# Patient Record
Sex: FEMALE | Race: BLACK OR AFRICAN AMERICAN | Employment: FULL TIME | URBAN - METROPOLITAN AREA
[De-identification: names, ages, dates, MRNs, and addresses within clinical notes are randomized per-mention and may not be internally consistent; named-entity substitution may affect disease eponyms.]

---

## 2017-12-03 ENCOUNTER — HOSPITAL ENCOUNTER (EMERGENCY)
Age: 37
Discharge: HOME | End: 2017-12-03
Attending: EMERGENCY MEDICINE
Payer: COMMERCIAL

## 2017-12-03 VITALS
WEIGHT: 270 LBS | OXYGEN SATURATION: 98 % | HEIGHT: 64 IN | TEMPERATURE: 97.8 F | RESPIRATION RATE: 18 BRPM | HEART RATE: 73 BPM | DIASTOLIC BLOOD PRESSURE: 92 MMHG | BODY MASS INDEX: 46.1 KG/M2 | SYSTOLIC BLOOD PRESSURE: 161 MMHG

## 2017-12-03 DIAGNOSIS — H61.21 IMPACTED CERUMEN OF RIGHT EAR: Primary | ICD-10-CM

## 2017-12-03 PROCEDURE — 74011250637 HC RX REV CODE- 250/637: Performed by: EMERGENCY MEDICINE

## 2017-12-03 PROCEDURE — 76010010392 HC REMOVAL IMPACTED WAX IRRIGATION/LVG UNI: Performed by: EMERGENCY MEDICINE

## 2017-12-03 PROCEDURE — 99284 EMERGENCY DEPT VISIT MOD MDM: CPT | Performed by: EMERGENCY MEDICINE

## 2017-12-03 RX ORDER — LIDOCAINE HYDROCHLORIDE 20 MG/ML
5 SOLUTION OROPHARYNGEAL
Status: DISCONTINUED | OUTPATIENT
Start: 2017-12-03 | End: 2017-12-03 | Stop reason: HOSPADM

## 2017-12-03 RX ORDER — ACETIC ACID 20.65 MG/ML
4 SOLUTION AURICULAR (OTIC) 4 TIMES DAILY
Qty: 15 ML | Refills: 0 | Status: SHIPPED | OUTPATIENT
Start: 2017-12-03 | End: 2017-12-10

## 2017-12-03 RX ADMIN — CARBAMIDE PEROXIDE 6.5% 5 DROP: 6.5 LIQUID AURICULAR (OTIC) at 12:44

## 2017-12-03 NOTE — ED NOTES
I have reviewed discharge instructions with the patient. The patient verbalized understanding. Patient left ED via Discharge Method: ambulatory to Home with family. Opportunity for questions and clarification provided. Patient given 1 scripts. To continue your aftercare when you leave the hospital, you may receive an automated call from our care team to check in on how you are doing. This is a free service and part of our promise to provide the best care and service to meet your aftercare needs.  If you have questions, or wish to unsubscribe from this service please call [phone]. Thank you for Choosing our [facility] Emergency Department.

## 2017-12-03 NOTE — DISCHARGE INSTRUCTIONS
Earwax Blockage: Care Instructions  Your Care Instructions    Earwax is a natural substance that protects the ear canal. Normally, earwax drains from the ears and does not cause problems. Sometimes earwax builds up and hardens. Earwax blockage (also called cerumen impaction) can cause some loss of hearing and pain. When wax is tightly packed, you will need to have your doctor remove it. Follow-up care is a key part of your treatment and safety. Be sure to make and go to all appointments, and call your doctor if you are having problems. It's also a good idea to know your test results and keep a list of the medicines you take. How can you care for yourself at home? · Do not try to remove earwax with cotton swabs, fingers, or other objects. This can make the blockage worse and damage the eardrum. · If your doctor recommends that you try to remove earwax at home:  ¨ Soften and loosen the earwax with warm mineral oil. You also can try hydrogen peroxide mixed with an equal amount of room temperature water. Place 2 drops of the fluid, warmed to body temperature, in the ear two times a day for up to 5 days. ¨ Once the wax is loose and soft, all that is usually needed to remove it from the ear canal is a gentle, warm shower. Direct the water into the ear, then tip your head to let the earwax drain out. Dry your ear thoroughly with a hair dryer set on low. Hold the dryer several inches from your ear. ¨ If the warm mineral oil and shower do not work, use an over-the-counter wax softener followed by gentle flushing with an ear syringe each night for a week or two. Make sure the flushing solution is body temperature. Cool or hot fluids in the ear can cause dizziness. When should you call for help? Call your doctor now or seek immediate medical care if:  ? · Pus or blood drains from your ear. ? · Your ears are ringing or feel full. ? · You have a loss of hearing. ? Watch closely for changes in your health, and be sure to contact your doctor if:  ? · You have pain or reduced hearing after 1 week of home treatment. ? · You have any new symptoms, such as nausea or balance problems. Where can you learn more? Go to http://edgardo-ethan.info/. Enter Y732 in the search box to learn more about \"Earwax Blockage: Care Instructions. \"  Current as of: March 20, 2017  Content Version: 11.4  © 8532-3229 [employer]. Care instructions adapted under license by [employer] (which disclaims liability or warranty for this information). If you have questions about a medical condition or this instruction, always ask your healthcare professional. [place] [address] any warranty or liability for your use of this information.

## 2017-12-03 NOTE — ED PROVIDER NOTES
HPI Comments: 58-year-old female presents with pain and decreased hearing from her right ear. She was seen for well DM check by internal medicine clinic 11/29. During that visit she reported decreased hearing from her right ear. She had right cerumen disimpaction and irrigation at that visit. She has had increasing discomfort since that time. She denies fever or drainage. Patient is a 42 y.o. female presenting with ear pain. The history is provided by the patient. Ear Pain    Associated symptoms include hearing loss. Pertinent negatives include no ear discharge, no headaches, no vomiting and no rash. Past Medical History:   Diagnosis Date    Diabetes ([address]     Hypertension        No past surgical history on file. No family history on file. Social History     Social History    Marital status: SINGLE     Spouse name: N/A    Number of children: N/A    Years of education: N/A     Occupational History    Not on file. Social History Main Topics    Smoking status: Never Smoker    Smokeless tobacco: Not on file    Alcohol use Yes      Comment: occasional    Drug use: No    Sexual activity: No     Other Topics Concern    Not on file     Social History Narrative         ALLERGIES: Coconut and Pineapple    Review of Systems   Constitutional: Negative for fever. HENT: Positive for ear pain and hearing loss. Negative for ear discharge and facial swelling. Eyes: Negative for visual disturbance. Gastrointestinal: Negative for vomiting. Skin: Negative for rash. Neurological: Negative for headaches. Vitals:    12/03/17 1139   BP: 149/76   Pulse: 99   Resp: 16   Temp: 97.6 °F (36.4 °C)   SpO2: 98%   Weight: 122.5 kg (270 lb)   Height: 5' 4\" (1.626 m)            Physical Exam   Constitutional: She appears well-nourished. No distress. HENT:   Head: Normocephalic. Right Ear: External ear and ear canal normal. No swelling. Decreased hearing is noted.    Left Ear: Hearing, tympanic membrane, external ear and ear canal normal. No swelling. Ears:    Nose: Nose normal.   Eyes: Conjunctivae and EOM are normal. Pupils are equal, round, and reactive to light. Neck: Neck supple. Musculoskeletal: Normal range of motion. Neurological: She is alert. Skin: Skin is dry. Psychiatric: She has a normal mood and affect. Nursing note and vitals reviewed. MDM  Number of Diagnoses or Management Options  Diagnosis management comments: Parts of this document were created using dragon voice recognition software. The chart has been reviewed but errors may still be present. 1:41 PM  Debrox applied and several attempts at irrigation with still large amounts of cerumen removed. However, still unable to visualize TM. Canal friable and extremely tender. Will treat with antibiotic ear drops. Advise close follow-up with primary care physician. No fever or other significant pain to suggest otitis media. I discussed the results of all labs, procedures, radiographs, and treatments with the patient and available family. Treatment plan is agreed upon and the patient is ready for discharge. Questions about treatment in the ED and differential diagnosis of presenting condition were answered. Patient was given verbal discharge instructions including, but not limited to, importance of returning to the emergency department for any concern of worsening or continued symptoms. Instructions were given to follow up with a primary care provider or specialist within 1-2 days. Adverse effects of medications, if prescribed, were discussed and patient was advised to refrain from significant physical activity until followed up by primary care physician and to not drive or operate heavy machinery after taking any sedating substances.         ED Course       Procedures

## 2019-09-18 ENCOUNTER — HOSPITAL ENCOUNTER (EMERGENCY)
Age: 39
Discharge: HOME | End: 2019-09-18
Attending: EMERGENCY MEDICINE
Payer: COMMERCIAL

## 2019-09-18 VITALS
HEART RATE: 110 BPM | DIASTOLIC BLOOD PRESSURE: 70 MMHG | WEIGHT: 270 LBS | RESPIRATION RATE: 20 BRPM | OXYGEN SATURATION: 97 % | TEMPERATURE: 98.5 F | SYSTOLIC BLOOD PRESSURE: 128 MMHG | BODY MASS INDEX: 46.35 KG/M2

## 2019-09-18 DIAGNOSIS — J06.9 VIRAL UPPER RESPIRATORY ILLNESS: Primary | ICD-10-CM

## 2019-09-18 PROCEDURE — 99281 EMR DPT VST MAYX REQ PHY/QHP: CPT | Performed by: PHYSICIAN ASSISTANT

## 2019-09-18 RX ORDER — AZELASTINE 1 MG/ML
1 SPRAY, METERED NASAL 2 TIMES DAILY
Qty: 1 BOTTLE | Refills: 0 | Status: SHIPPED | OUTPATIENT
Start: 2019-09-18

## 2019-09-18 RX ORDER — GUAIFENESIN AND DEXTROMETHORPHAN HYDROBROMIDE 1200; 60 MG/1; MG/1
1 TABLET, EXTENDED RELEASE ORAL
Qty: 28 TAB | Refills: 0 | Status: SHIPPED | OUTPATIENT
Start: 2019-09-18

## 2019-09-18 NOTE — LETTER
[address] [facility] EMERGENCY DEPT 
ONE  [address] [facility] [address] 
[phone] Work/School Note Date: 9/18/2019 To Whom It May concern: 
 
Seema Rey was seen and treated today in the emergency room by the following provider(s): 
Attending Provider: Sylvie Garcia DO Physician Assistant: SUSAN Wolff. Seema Rey may return to work on 09/20/19. Sincerely, SUSAN Alamo

## 2019-09-18 NOTE — DISCHARGE INSTRUCTIONS
Patient Education        Viral Respiratory Infection: Care Instructions  Your Care Instructions    Viruses are very small organisms. They grow in number after they enter your body. There are many types that cause different illnesses, such as colds and the mumps. The symptoms of a viral respiratory infection often start quickly. They include a fever, sore throat, and runny nose. You may also just not feel well. Or you may not want to eat much. Most viral respiratory infections are not serious. They usually get better with time and self-care. Antibiotics are not used to treat a viral infection. That's because antibiotics will not help cure a viral illness. In some cases, antiviral medicine can help your body fight a serious viral infection. Follow-up care is a key part of your treatment and safety. Be sure to make and go to all appointments, and call your doctor if you are having problems. It's also a good idea to know your test results and keep a list of the medicines you take. How can you care for yourself at home? · Rest as much as possible until you feel better. · Be safe with medicines. Take your medicine exactly as prescribed. Call your doctor if you think you are having a problem with your medicine. You will get more details on the specific medicine your doctor prescribes. · Take an over-the-counter pain medicine, such as acetaminophen (Tylenol), ibuprofen (Advil, Motrin), or naproxen (Aleve), as needed for pain and fever. Read and follow all instructions on the label. Do not give aspirin to anyone younger than 20. It has been linked to Reye syndrome, a serious illness. · Drink plenty of fluids, enough so that your urine is light yellow or clear like water. Hot fluids, such as tea or soup, may help relieve congestion in your nose and throat. If you have kidney, heart, or liver disease and have to limit fluids, talk with your doctor before you increase the amount of fluids you drink.   · Try to clear mucus from your lungs by breathing deeply and coughing. · Gargle with warm salt water once an hour. This can help reduce swelling and throat pain. Use 1 teaspoon of salt mixed in 1 cup of warm water. · Do not smoke or allow others to smoke around you. If you need help quitting, talk to your doctor about stop-smoking programs and medicines. These can increase your chances of quitting for good. To avoid spreading the virus  · Cough or sneeze into a tissue. Then throw the tissue away. · If you don't have a tissue, use your hand to cover your cough or sneeze. Then clean your hand. You can also cough into your sleeve. · Wash your hands often. Use soap and warm water. Wash for 15 to 20 seconds each time. · If you don't have soap and water near you, you can clean your hands with alcohol wipes or gel. When should you call for help? Call your doctor now or seek immediate medical care if:    · You have a new or higher fever.     · Your fever lasts more than 48 hours.     · You have trouble breathing.     · You have a fever with a stiff neck or a severe headache.     · You are sensitive to light.     · You feel very sleepy or confused.    Watch closely for changes in your health, and be sure to contact your doctor if:    · You do not get better as expected. Where can you learn more? Go to http://edgardo-ethan.info/. Enter E474 in the search box to learn more about \"Viral Respiratory Infection: Care Instructions. \"  Current as of: September 5, 2018  Content Version: 12.1  © 1889-8137 [employer]. Care instructions adapted under license by [employer] (which disclaims liability or warranty for this information). If you have questions about a medical condition or this instruction, always ask your healthcare professional. [address] any warranty or liability for your use of this information.

## 2019-09-18 NOTE — ED PROVIDER NOTES
The history is provided by the patient. Nasal Congestion   This is a new problem. The current episode started more than 1 week ago. The problem occurs constantly. The problem has been gradually improving. Pertinent negatives include no chest pain, no abdominal pain, no headaches and no shortness of breath. Nothing aggravates the symptoms. Nothing relieves the symptoms. Treatments tried: Allegra. The treatment provided mild relief. Past Medical History:   Diagnosis Date    Diabetes ([facility] [address]     Hypertension        History reviewed. No pertinent surgical history. History reviewed. No pertinent family history.     Social History     Socioeconomic History    Marital status: SINGLE     Spouse name: Not on file    Number of children: Not on file    Years of education: Not on file    Highest education level: Not on file   Occupational History    Not on file   Social Needs    Financial resource strain: Not on file    Food insecurity:     Worry: Not on file     Inability: Not on file    Transportation needs:     Medical: Not on file     Non-medical: Not on file   Tobacco Use    Smoking status: Never Smoker    Smokeless tobacco: Never Used   Substance and Sexual Activity    Alcohol use: Yes     Comment: occasional    Drug use: No    Sexual activity: Never   Lifestyle    Physical activity:     Days per week: Not on file     Minutes per session: Not on file    Stress: Not on file   Relationships    Social connections:     Talks on phone: Not on file     Gets together: Not on file     Attends [employer] service: Not on file     Active member of club or organization: Not on file     Attends meetings of clubs or organizations: Not on file     Relationship status: Not on file    Intimate partner violence:     Fear of current or ex partner: Not on file     Emotionally abused: Not on file     Physically abused: Not on file     Forced sexual activity: Not on file   Other Topics Concern    Not on file Social History Narrative    Not on file         ALLERGIES: Coconut and Pineapple    Review of Systems   Constitutional: Negative. HENT: Positive for congestion, rhinorrhea and sneezing. Eyes: Negative. Respiratory: Negative. Negative for shortness of breath. Cardiovascular: Negative. Negative for chest pain. Gastrointestinal: Negative. Negative for abdominal pain. Genitourinary: Negative. Musculoskeletal: Negative. Skin: Negative. Neurological: Negative. Negative for headaches. Psychiatric/Behavioral: Negative. All other systems reviewed and are negative. Vitals:    09/18/19 1230   BP: 128/70   Pulse: (!) 110   Resp: 20   Temp: 98.5 °F (36.9 °C)   SpO2: 97%   Weight: 122.5 kg (270 lb)            Physical Exam   Constitutional: She is oriented to person, place, and time. She appears well-developed and well-nourished. HENT:   Head: Normocephalic and atraumatic. Right Ear: External ear normal.   Left Ear: External ear normal.   Mouth/Throat: Oropharynx is clear and moist.   There is mild swelling to the nasal turbinates bilaterally with clear rhinorrhea. No posterior pharyngeal erythema or swelling and no lymphadenopathy in the submandibular or cervical area. Eyes: Pupils are equal, round, and reactive to light. Conjunctivae and EOM are normal.   Neck: Normal range of motion. Neck supple. Cardiovascular: Normal rate, regular rhythm, normal heart sounds and intact distal pulses. Pulmonary/Chest: Effort normal and breath sounds normal. No stridor. No respiratory distress. She has no wheezes. She has no rales. She exhibits no tenderness. Abdominal: Soft. Bowel sounds are normal.   Musculoskeletal: Normal range of motion. Neurological: She is alert and oriented to person, place, and time. She has normal reflexes. Skin: Skin is warm and dry. Psychiatric: She has a normal mood and affect.  Her behavior is normal. Judgment and thought content normal.   Nursing note and vitals reviewed. MDM  Number of Diagnoses or Management Options  Viral upper respiratory illness:   Risk of Complications, Morbidity, and/or Mortality  Presenting problems: moderate  Diagnostic procedures: moderate  Management options: moderate    Patient Progress  Patient progress: stable         Procedures  The patient was observed in the ED. Patient appears to have a viral infection today. Her vital signs are stable, and exam is unremarkable. She was encouraged to drink plenty of fluids, rest, follow-up with her primary care physician and return to the emergency room if worsening. Use medication as directed, if OTC or prescription meds were given. I discussed the results of all labs, procedures, radiographs, and treatments with the patient and available family. Treatment plan is agreed upon and the patient is ready for discharge. All voiced understanding of the discharge plan and medication instructions or changes as appropriate. Questions about treatment in the ED were answered. All were encouraged to return should symptoms worsen or new problems develop.

## 2023-02-02 ENCOUNTER — APPOINTMENT (OUTPATIENT)
Dept: GENERAL RADIOLOGY | Age: 43
End: 2023-02-02
Payer: COMMERCIAL

## 2023-02-02 ENCOUNTER — HOSPITAL ENCOUNTER (INPATIENT)
Age: 43
LOS: 1 days | Discharge: HOME | End: 2023-02-03
Attending: EMERGENCY MEDICINE | Admitting: HOSPITALIST
Payer: COMMERCIAL

## 2023-02-02 ENCOUNTER — APPOINTMENT (OUTPATIENT)
Dept: CT IMAGING | Age: 43
End: 2023-02-02
Payer: COMMERCIAL

## 2023-02-02 DIAGNOSIS — N17.9 AKI (ACUTE KIDNEY INJURY) (HCC): ICD-10-CM

## 2023-02-02 DIAGNOSIS — R42 DIZZINESS: ICD-10-CM

## 2023-02-02 DIAGNOSIS — R53.1 GENERAL WEAKNESS: ICD-10-CM

## 2023-02-02 DIAGNOSIS — E16.2 HYPOGLYCEMIA: Primary | ICD-10-CM

## 2023-02-02 PROBLEM — I10 PRIMARY HYPERTENSION: Status: ACTIVE | Noted: 2023-02-02

## 2023-02-02 PROBLEM — E78.5 DYSLIPIDEMIA: Status: ACTIVE | Noted: 2023-02-02

## 2023-02-02 PROBLEM — E66.01 CLASS 3 SEVERE OBESITY IN ADULT (HCC): Status: ACTIVE | Noted: 2023-02-02

## 2023-02-02 LAB
ALBUMIN SERPL-MCNC: 3.5 G/DL (ref 3.5–5)
ALBUMIN/GLOB SERPL: 0.9 (ref 0.4–1.6)
ALP SERPL-CCNC: 55 U/L (ref 50–136)
ALT SERPL-CCNC: 33 U/L (ref 12–65)
ANION GAP SERPL CALC-SCNC: 8 MMOL/L (ref 2–11)
APPEARANCE UR: CLEAR
AST SERPL-CCNC: 23 U/L (ref 15–37)
BASOPHILS # BLD: 0 K/UL (ref 0–0.2)
BASOPHILS NFR BLD: 1 % (ref 0–2)
BILIRUB SERPL-MCNC: 0.2 MG/DL (ref 0.2–1.1)
BILIRUB UR QL: NEGATIVE
BUN SERPL-MCNC: 30 MG/DL (ref 6–23)
CALCIUM SERPL-MCNC: 8.9 MG/DL (ref 8.3–10.4)
CHLORIDE SERPL-SCNC: 109 MMOL/L (ref 101–110)
CO2 SERPL-SCNC: 25 MMOL/L (ref 21–32)
COLOR UR: NORMAL
CREAT SERPL-MCNC: 2.2 MG/DL (ref 0.6–1)
DIFFERENTIAL METHOD BLD: ABNORMAL
EKG ATRIAL RATE: 71 BPM
EKG DIAGNOSIS: NORMAL
EKG P AXIS: 61 DEGREES
EKG P-R INTERVAL: 125 MS
EKG Q-T INTERVAL: 388 MS
EKG QRS DURATION: 73 MS
EKG QTC CALCULATION (BAZETT): 419 MS
EKG R AXIS: 36 DEGREES
EKG T AXIS: 35 DEGREES
EKG VENTRICULAR RATE: 70 BPM
EOSINOPHIL # BLD: 0.1 K/UL (ref 0–0.8)
EOSINOPHIL NFR BLD: 1 % (ref 0.5–7.8)
ERYTHROCYTE [DISTWIDTH] IN BLOOD BY AUTOMATED COUNT: 13.9 % (ref 11.9–14.6)
GLOBULIN SER CALC-MCNC: 3.8 G/DL (ref 2.8–4.5)
GLUCOSE BLD STRIP.AUTO-MCNC: 114 MG/DL (ref 65–100)
GLUCOSE BLD STRIP.AUTO-MCNC: 118 MG/DL (ref 65–100)
GLUCOSE BLD STRIP.AUTO-MCNC: 121 MG/DL (ref 65–100)
GLUCOSE BLD STRIP.AUTO-MCNC: 130 MG/DL (ref 65–100)
GLUCOSE BLD STRIP.AUTO-MCNC: 135 MG/DL (ref 65–100)
GLUCOSE BLD STRIP.AUTO-MCNC: 49 MG/DL (ref 65–100)
GLUCOSE BLD STRIP.AUTO-MCNC: 51 MG/DL (ref 65–100)
GLUCOSE BLD STRIP.AUTO-MCNC: 92 MG/DL (ref 65–100)
GLUCOSE SERPL-MCNC: 46 MG/DL (ref 65–100)
GLUCOSE UR STRIP.AUTO-MCNC: NEGATIVE MG/DL
HCT VFR BLD AUTO: 29.9 % (ref 35.8–46.3)
HGB BLD-MCNC: 9.7 G/DL (ref 11.7–15.4)
HGB UR QL STRIP: NEGATIVE
IMM GRANULOCYTES # BLD AUTO: 0 K/UL (ref 0–0.5)
IMM GRANULOCYTES NFR BLD AUTO: 1 % (ref 0–5)
KETONES UR QL STRIP.AUTO: NEGATIVE MG/DL
LEUKOCYTE ESTERASE UR QL STRIP.AUTO: NEGATIVE
LYMPHOCYTES # BLD: 1.9 K/UL (ref 0.5–4.6)
LYMPHOCYTES NFR BLD: 29 % (ref 13–44)
MAGNESIUM SERPL-MCNC: 1.7 MG/DL (ref 1.8–2.4)
MCH RBC QN AUTO: 29.8 PG (ref 26.1–32.9)
MCHC RBC AUTO-ENTMCNC: 32.4 G/DL (ref 31.4–35)
MCV RBC AUTO: 92 FL (ref 82–102)
MONOCYTES # BLD: 0.5 K/UL (ref 0.1–1.3)
MONOCYTES NFR BLD: 7 % (ref 4–12)
NEUTS SEG # BLD: 4 K/UL (ref 1.7–8.2)
NEUTS SEG NFR BLD: 61 % (ref 43–78)
NITRITE UR QL STRIP.AUTO: NEGATIVE
NRBC # BLD: 0 K/UL (ref 0–0.2)
PH UR STRIP: 6 (ref 5–9)
PLATELET # BLD AUTO: 209 K/UL (ref 150–450)
PMV BLD AUTO: 10.4 FL (ref 9.4–12.3)
POTASSIUM SERPL-SCNC: 4 MMOL/L (ref 3.5–5.1)
PROT SERPL-MCNC: 7.3 G/DL (ref 6.3–8.2)
PROT UR STRIP-MCNC: NEGATIVE MG/DL
RBC # BLD AUTO: 3.25 M/UL (ref 4.05–5.2)
SERVICE CMNT-IMP: ABNORMAL
SERVICE CMNT-IMP: NORMAL
SODIUM SERPL-SCNC: 142 MMOL/L (ref 133–143)
SP GR UR REFRACTOMETRY: 1.01 (ref 1–1.02)
UROBILINOGEN UR QL STRIP.AUTO: 0.2 EU/DL (ref 0.2–1)
WBC # BLD AUTO: 6.5 K/UL (ref 4.3–11.1)

## 2023-02-02 PROCEDURE — 2580000003 HC RX 258: Performed by: PHYSICIAN ASSISTANT

## 2023-02-02 PROCEDURE — 99285 EMERGENCY DEPT VISIT HI MDM: CPT

## 2023-02-02 PROCEDURE — 70450 CT HEAD/BRAIN W/O DYE: CPT

## 2023-02-02 PROCEDURE — 6360000002 HC RX W HCPCS: Performed by: HOSPITALIST

## 2023-02-02 PROCEDURE — 85025 COMPLETE CBC W/AUTO DIFF WBC: CPT

## 2023-02-02 PROCEDURE — 2580000003 HC RX 258: Performed by: HOSPITALIST

## 2023-02-02 PROCEDURE — 83735 ASSAY OF MAGNESIUM: CPT

## 2023-02-02 PROCEDURE — 71046 X-RAY EXAM CHEST 2 VIEWS: CPT

## 2023-02-02 PROCEDURE — 82962 GLUCOSE BLOOD TEST: CPT

## 2023-02-02 PROCEDURE — 93005 ELECTROCARDIOGRAM TRACING: CPT | Performed by: PHYSICIAN ASSISTANT

## 2023-02-02 PROCEDURE — 80053 COMPREHEN METABOLIC PANEL: CPT

## 2023-02-02 PROCEDURE — 6370000000 HC RX 637 (ALT 250 FOR IP): Performed by: HOSPITALIST

## 2023-02-02 PROCEDURE — 81003 URINALYSIS AUTO W/O SCOPE: CPT

## 2023-02-02 PROCEDURE — 1100000000 HC RM PRIVATE

## 2023-02-02 PROCEDURE — 97165 OT EVAL LOW COMPLEX 30 MIN: CPT

## 2023-02-02 PROCEDURE — 97535 SELF CARE MNGMENT TRAINING: CPT

## 2023-02-02 RX ORDER — HYDROCHLOROTHIAZIDE 25 MG/1
25 TABLET ORAL DAILY
Status: DISCONTINUED | OUTPATIENT
Start: 2023-02-02 | End: 2023-02-03 | Stop reason: HOSPADM

## 2023-02-02 RX ORDER — ACETAMINOPHEN 325 MG/1
650 TABLET ORAL EVERY 6 HOURS PRN
Status: DISCONTINUED | OUTPATIENT
Start: 2023-02-02 | End: 2023-02-03 | Stop reason: HOSPADM

## 2023-02-02 RX ORDER — FAMOTIDINE 20 MG/1
20 TABLET, FILM COATED ORAL DAILY
Status: DISCONTINUED | OUTPATIENT
Start: 2023-02-02 | End: 2023-02-03 | Stop reason: HOSPADM

## 2023-02-02 RX ORDER — POTASSIUM CHLORIDE 20 MEQ/1
40 TABLET, EXTENDED RELEASE ORAL PRN
Status: DISCONTINUED | OUTPATIENT
Start: 2023-02-02 | End: 2023-02-03 | Stop reason: HOSPADM

## 2023-02-02 RX ORDER — SODIUM CHLORIDE 0.9 % (FLUSH) 0.9 %
5-40 SYRINGE (ML) INJECTION PRN
Status: DISCONTINUED | OUTPATIENT
Start: 2023-02-02 | End: 2023-02-03 | Stop reason: HOSPADM

## 2023-02-02 RX ORDER — POLYETHYLENE GLYCOL 3350 17 G/17G
17 POWDER, FOR SOLUTION ORAL DAILY PRN
Status: DISCONTINUED | OUTPATIENT
Start: 2023-02-02 | End: 2023-02-03 | Stop reason: HOSPADM

## 2023-02-02 RX ORDER — DEXTROSE, SODIUM CHLORIDE, SODIUM LACTATE, POTASSIUM CHLORIDE, AND CALCIUM CHLORIDE 5; .6; .31; .03; .02 G/100ML; G/100ML; G/100ML; G/100ML; G/100ML
INJECTION, SOLUTION INTRAVENOUS CONTINUOUS
Status: DISCONTINUED | OUTPATIENT
Start: 2023-02-02 | End: 2023-02-03

## 2023-02-02 RX ORDER — POTASSIUM CHLORIDE 7.45 MG/ML
10 INJECTION INTRAVENOUS PRN
Status: DISCONTINUED | OUTPATIENT
Start: 2023-02-02 | End: 2023-02-03 | Stop reason: HOSPADM

## 2023-02-02 RX ORDER — ASPIRIN 81 MG/1
81 TABLET, CHEWABLE ORAL DAILY
COMMUNITY
Start: 2022-09-07

## 2023-02-02 RX ORDER — ATORVASTATIN CALCIUM 40 MG/1
40 TABLET, FILM COATED ORAL NIGHTLY
Status: DISCONTINUED | OUTPATIENT
Start: 2023-02-02 | End: 2023-02-03 | Stop reason: HOSPADM

## 2023-02-02 RX ORDER — ACETAMINOPHEN 650 MG/1
650 SUPPOSITORY RECTAL EVERY 6 HOURS PRN
Status: DISCONTINUED | OUTPATIENT
Start: 2023-02-02 | End: 2023-02-03 | Stop reason: HOSPADM

## 2023-02-02 RX ORDER — DEXTROSE MONOHYDRATE 100 MG/ML
INJECTION, SOLUTION INTRAVENOUS
Status: COMPLETED | OUTPATIENT
Start: 2023-02-02 | End: 2023-02-02

## 2023-02-02 RX ORDER — MAGNESIUM SULFATE IN WATER 40 MG/ML
4000 INJECTION, SOLUTION INTRAVENOUS ONCE
Status: COMPLETED | OUTPATIENT
Start: 2023-02-02 | End: 2023-02-02

## 2023-02-02 RX ORDER — ASPIRIN 81 MG/1
81 TABLET, CHEWABLE ORAL DAILY
Status: DISCONTINUED | OUTPATIENT
Start: 2023-02-02 | End: 2023-02-03 | Stop reason: HOSPADM

## 2023-02-02 RX ORDER — FAMOTIDINE 20 MG/1
20 TABLET, FILM COATED ORAL DAILY
Status: ON HOLD | COMMUNITY
Start: 2022-11-22 | End: 2023-02-03 | Stop reason: HOSPADM

## 2023-02-02 RX ORDER — ONDANSETRON 4 MG/1
4 TABLET, ORALLY DISINTEGRATING ORAL EVERY 8 HOURS PRN
Status: DISCONTINUED | OUTPATIENT
Start: 2023-02-02 | End: 2023-02-03 | Stop reason: HOSPADM

## 2023-02-02 RX ORDER — 0.9 % SODIUM CHLORIDE 0.9 %
1000 INTRAVENOUS SOLUTION INTRAVENOUS ONCE
Status: DISCONTINUED | OUTPATIENT
Start: 2023-02-02 | End: 2023-02-02

## 2023-02-02 RX ORDER — ATORVASTATIN CALCIUM 40 MG/1
40 TABLET, FILM COATED ORAL NIGHTLY
COMMUNITY
Start: 2022-09-07

## 2023-02-02 RX ORDER — DULAGLUTIDE 4.5 MG/.5ML
4.5 INJECTION, SOLUTION SUBCUTANEOUS WEEKLY
Status: ON HOLD | COMMUNITY
Start: 2023-01-03 | End: 2023-02-03 | Stop reason: HOSPADM

## 2023-02-02 RX ORDER — ONDANSETRON 2 MG/ML
4 INJECTION INTRAMUSCULAR; INTRAVENOUS EVERY 6 HOURS PRN
Status: DISCONTINUED | OUTPATIENT
Start: 2023-02-02 | End: 2023-02-03 | Stop reason: HOSPADM

## 2023-02-02 RX ORDER — SODIUM CHLORIDE 9 MG/ML
INJECTION, SOLUTION INTRAVENOUS PRN
Status: DISCONTINUED | OUTPATIENT
Start: 2023-02-02 | End: 2023-02-03 | Stop reason: HOSPADM

## 2023-02-02 RX ORDER — ENOXAPARIN SODIUM 100 MG/ML
40 INJECTION SUBCUTANEOUS DAILY
Status: DISCONTINUED | OUTPATIENT
Start: 2023-02-02 | End: 2023-02-03 | Stop reason: HOSPADM

## 2023-02-02 RX ORDER — SODIUM CHLORIDE 0.9 % (FLUSH) 0.9 %
5-40 SYRINGE (ML) INJECTION EVERY 12 HOURS SCHEDULED
Status: DISCONTINUED | OUTPATIENT
Start: 2023-02-02 | End: 2023-02-03 | Stop reason: HOSPADM

## 2023-02-02 RX ORDER — MAGNESIUM SULFATE IN WATER 40 MG/ML
2000 INJECTION, SOLUTION INTRAVENOUS PRN
Status: DISCONTINUED | OUTPATIENT
Start: 2023-02-02 | End: 2023-02-03 | Stop reason: HOSPADM

## 2023-02-02 RX ADMIN — SODIUM CHLORIDE, PRESERVATIVE FREE 10 ML: 5 INJECTION INTRAVENOUS at 20:25

## 2023-02-02 RX ADMIN — SODIUM CHLORIDE, SODIUM LACTATE, POTASSIUM CHLORIDE, CALCIUM CHLORIDE AND DEXTROSE MONOHYDRATE: 5; 600; 310; 30; 20 INJECTION, SOLUTION INTRAVENOUS at 13:11

## 2023-02-02 RX ADMIN — ATORVASTATIN CALCIUM 40 MG: 40 TABLET, FILM COATED ORAL at 20:24

## 2023-02-02 RX ADMIN — FAMOTIDINE 20 MG: 20 TABLET, FILM COATED ORAL at 15:34

## 2023-02-02 RX ADMIN — DEXTROSE MONOHYDRATE: 100 INJECTION, SOLUTION INTRAVENOUS at 11:24

## 2023-02-02 RX ADMIN — SODIUM CHLORIDE, SODIUM LACTATE, POTASSIUM CHLORIDE, CALCIUM CHLORIDE AND DEXTROSE MONOHYDRATE: 5; 600; 310; 30; 20 INJECTION, SOLUTION INTRAVENOUS at 21:20

## 2023-02-02 RX ADMIN — MAGNESIUM SULFATE HEPTAHYDRATE 4000 MG: 40 INJECTION, SOLUTION INTRAVENOUS at 16:34

## 2023-02-02 RX ADMIN — ASPIRIN 81 MG: 81 TABLET, CHEWABLE ORAL at 15:34

## 2023-02-02 RX ADMIN — HYDROCHLOROTHIAZIDE 25 MG: 25 TABLET ORAL at 15:34

## 2023-02-02 RX ADMIN — ENOXAPARIN SODIUM 40 MG: 100 INJECTION SUBCUTANEOUS at 15:35

## 2023-02-02 ASSESSMENT — ENCOUNTER SYMPTOMS
RESPIRATORY NEGATIVE: 1
GASTROINTESTINAL NEGATIVE: 1

## 2023-02-02 ASSESSMENT — PAIN DESCRIPTION - LOCATION: LOCATION: HEAD

## 2023-02-02 ASSESSMENT — LIFESTYLE VARIABLES
HOW OFTEN DO YOU HAVE A DRINK CONTAINING ALCOHOL: NEVER
HOW MANY STANDARD DRINKS CONTAINING ALCOHOL DO YOU HAVE ON A TYPICAL DAY: PATIENT DOES NOT DRINK

## 2023-02-02 ASSESSMENT — PAIN - FUNCTIONAL ASSESSMENT: PAIN_FUNCTIONAL_ASSESSMENT: 0-10

## 2023-02-02 ASSESSMENT — PAIN SCALES - GENERAL: PAINLEVEL_OUTOF10: 7

## 2023-02-02 NOTE — ED PROVIDER NOTES
Emergency Department Provider Note                   PCP:                Stepan ANAYA DO               Age: 38 y.o. Sex: female       ICD-10-CM    1. Hypoglycemia  E16.2       2. Dizziness  R42       3. General weakness  R53.1       4. ANDRZEJ (acute kidney injury) ([facility] [address]  N17.9           DISPOSITION Decision To Admit 02/02/2023 12:13:37 PM        Medical Decision Making  Patient is 64-year-old female history of diabetes who presents with 2 weeks of generalized fatigue intermittent dizziness and weakness. Co Worker states she seemed confused. They gave her candy and orange juice and she seemed to feel better but then they brought her to the ER. She was diaphoretic at work as well. Upon arrival blood sugar was 50. Repeat evaluation it was in the 40s. She is eating and drinking and awake. Feeling better now. She does take [place] and possibly other glucose lowering agents, but discussed with pharmacist and does not like she is picking up anything else consistently from the pharmacy. She is placed on D10 drip, eating and drinking and feeling better. Also has ANDRZEJ. Hospitalist consulted for admission. Amount and/or Complexity of Data Reviewed  Labs: ordered. Decision-making details documented in ED Course. Radiology: ordered. ECG/medicine tests: ordered. Decision-making details documented in ED Course. Risk  Prescription drug management. Decision regarding hospitalization. ED Course as of 02/02/23 1215   Thu Feb 02, 2023   1030 Pt given orange juice and sandwich tray [ARIANNE]   1049 Hemoglobin Quant(!): 9.7  Old records from outside hospital reviewed- HGB 10.3 a year ago, 9.2 prior to that. [ARIANNE]   1103 EKG 12 Lead  EKG shows sinus rhythm at rate of 70. No acute ischemic changes. No STEMI. QTc 419. [ARIANNE]   1106 Creatinine(!): 2.20  ANDRZEJ.  Cr last month was 1.35 [ARIANNE]      ED Course User Index  [ARIANNE] GRIFFIN Neely        Orders Placed This Encounter   Procedures    CT Head W/O Contrast    XR CHEST (2 VW)    CBC with Auto Differential    CMP    Urinalysis w rflx microscopic    Magnesium    Orthostatic blood pressure and pulse    POCT Glucose    POCT Glucose    POCT Glucose    POCT Glucose    POCT Glucose    EKG 12 Lead        Medications   dextrose 10 % infusion ( IntraVENous New Bag 2/2/23 1124)       New Prescriptions    No medications on file        Ginette Alexandre is a 38 y.o. female who presents to the Emergency Department with chief complaint of    Chief Complaint   Patient presents with    Dizziness    Headache      Patient is 58-year-old female with history of hypertension diabetes who presents from work with a coworker. This of the past 2 weeks she has seemed off. She tells me she has had a headache and dizziness for about 2 weeks. Intermittent confusion. Today at work she continued to seem off so they said this is enough and you need to come to the ER. Patient complains of diffuse headache and generalized dizziness and generalized weakness. For the past 2 weeks she has felt this way, sometimes she feels worse than others. She denies any vision changes numbness or weakness. No abdominal pain. No chest pain or shortness of breath. Her Coworker is worried that this is a blood sugar issue. She was clammy at work so they gave her orange juice and candy and this seemed to improve symptoms. BGL 51 in triage. pt says she took her insulin this am and ate. Review of Systems   Constitutional: Negative. HENT: Negative. Respiratory: Negative. Cardiovascular: Negative. Gastrointestinal: Negative. Genitourinary: Negative. Neurological:  Positive for dizziness and headaches. Psychiatric/Behavioral:  Positive for confusion. All other systems reviewed and are negative. Past Medical History:   Diagnosis Date    Diabetes ([address]     Hypertension         History reviewed. No pertinent surgical history. No family history on file.      Social History Socioeconomic History    Marital status: Single     Spouse name: None    Number of children: None    Years of education: None    Highest education level: None   Tobacco Use    Smoking status: Never    Smokeless tobacco: Never   Substance and Sexual Activity    Alcohol use: Yes    Drug use: No         Food and Pineapple     Previous Medications    AMOXICILLIN 500 MG TABS    Take 500 mg by mouth 3 times daily    ASPIRIN 81 MG CHEWABLE TABLET    Take 81 mg by mouth daily    ATORVASTATIN (LIPITOR) 40 MG TABLET    Take 40 mg by mouth at bedtime    AZELASTINE (ASTELIN) 0.1 % NASAL SPRAY    1 spray by Nasal route 2 times daily    DEXTROMETHORPHAN-GUAIFENESIN  MG TB12    Take 1 tablet by mouth 2 times daily as needed    DULAGLUTIDE (TRULICITY) 4.5 DB/0.9RR SOPN    Inject 4.5 mg into the skin once a week    FAMOTIDINE (PEPCID) 20 MG TABLET    Take 20 mg by mouth daily    HYDROCHLOROTHIAZIDE (HYDRODIURIL) 25 MG TABLET    Take 25 mg by mouth daily    METFORMIN (GLUCOPHAGE) 500 MG TABLET    Take 500 mg by mouth 2 times daily (with meals)    PREDNISONE 10 MG (21) TBPK    As instructed on box        Vitals signs and nursing note reviewed. Patient Vitals for the past 4 hrs:   Temp Pulse Resp BP SpO2   02/02/23 1146 -- 81 18 127/66 --   02/02/23 1014 98.1 °F (36.7 °C) 70 16 114/63 100 %          Physical Exam  Vitals and nursing note reviewed. Constitutional:       General: She is not in acute distress. Appearance: She is well-developed. She is obese. She is not ill-appearing or toxic-appearing. HENT:      Head: Normocephalic. Eyes:      Comments: Dysconjugate gaze. Chronic, unchanged. Cardiovascular:      Rate and Rhythm: Normal rate and regular rhythm. Pulses: Normal pulses. Heart sounds: Normal heart sounds. Pulmonary:      Effort: Pulmonary effort is normal.      Breath sounds: Normal breath sounds. Abdominal:      General: Bowel sounds are normal.      Palpations: Abdomen is soft. Tenderness: There is no abdominal tenderness. There is no guarding or rebound. Musculoskeletal:         General: Normal range of motion. Cervical back: Normal range of motion and neck supple. Skin:     General: Skin is warm and dry. Findings: No rash. Neurological:      General: No focal deficit present. Mental Status: She is alert and oriented to person, place, and time. Mental status is at baseline. Cranial Nerves: No cranial nerve deficit. Sensory: No sensory deficit. Motor: No weakness. Psychiatric:         Mood and Affect: Mood normal.         Behavior: Behavior normal.         Thought Content: Thought content normal.        Procedures    Results for orders placed or performed during the hospital encounter of 02/02/23   CT Head W/O Contrast    Narrative    CT HEAD WO CONTRAST 2/2/2023 10:43 AM    HISTORY: Headache, dizziness and confusion over the past 2 weeks. COMPARISON: None available. TECHNIQUE: Multiple axial images obtained through the brain without intravenous  contrast.  Radiation dose reduction techniques were used for this study: All CT  scans performed at this facility use one or all of the following: Automated  exposure control, adjustment of the mA and/or kVp according to patient's size,  iterative reconstruction. FINDINGS: No areas of abnormal attenuation are seen in the brain. There is no CT  evidence of acute hemorrhage or infarction. The ventricles are normal in size. There are no extra-axial fluid collections. No masses are seen. Mucous retention  cyst or polyp noted in the left maxillary sinus. Remaining paranasal sinuses and  mastoid air cells are clear. There are no bony lesions. Impression    No CT evidence of acute intracranial abnormality. XR CHEST (2 VW)    Narrative    XR CHEST (2 VW) 2/2/2023 10:33 AM     HISTORY: Dizziness. COMPARISON: None. AP and lateral views of the chest were obtained.       Impression    The lungs are clear. The heart size is normal in size. No  pneumothorax. No pleural effusions. CBC with Auto Differential   Result Value Ref Range    WBC 6.5 4.3 - 11.1 K/uL    RBC 3.25 (L) 4.05 - 5.2 M/uL    Hemoglobin 9.7 (L) 11.7 - 15.4 g/dL    Hematocrit 29.9 (L) 35.8 - 46.3 %    MCV 92.0 82 - 102 FL    MCH 29.8 26.1 - 32.9 PG    MCHC 32.4 31.4 - 35.0 g/dL    RDW 13.9 11.9 - 14.6 %    Platelets 420 940 - 048 K/uL    MPV 10.4 9.4 - 12.3 FL    nRBC 0.00 0.0 - 0.2 K/uL    Differential Type AUTOMATED      Seg Neutrophils 61 43 - 78 %    Lymphocytes 29 13 - 44 %    Monocytes 7 4.0 - 12.0 %    Eosinophils % 1 0.5 - 7.8 %    Basophils 1 0.0 - 2.0 %    Immature Granulocytes 1 0.0 - 5.0 %    Segs Absolute 4.0 1.7 - 8.2 K/UL    Absolute Lymph # 1.9 0.5 - 4.6 K/UL    Absolute Mono # 0.5 0.1 - 1.3 K/UL    Absolute Eos # 0.1 0.0 - 0.8 K/UL    Basophils Absolute 0.0 0.0 - 0.2 K/UL    Absolute Immature Granulocyte 0.0 0.0 - 0.5 K/UL   CMP   Result Value Ref Range    Sodium 142 133 - 143 mmol/L    Potassium 4.0 3.5 - 5.1 mmol/L    Chloride 109 101 - 110 mmol/L    CO2 25 21 - 32 mmol/L    Anion Gap 8 2 - 11 mmol/L    Glucose 46 (L) 65 - 100 mg/dL    BUN 30 (H) 6 - 23 MG/DL    Creatinine 2.20 (H) 0.6 - 1.0 MG/DL    Est, Glom Filt Rate 28 (L) >60 ml/min/1.73m2    Calcium 8.9 8.3 - 10.4 MG/DL    Total Bilirubin 0.2 0.2 - 1.1 MG/DL    ALT 33 12 - 65 U/L    AST 23 15 - 37 U/L    Alk Phosphatase 55 50 - 136 U/L    Total Protein 7.3 6.3 - 8.2 g/dL    Albumin 3.5 3.5 - 5.0 g/dL    Globulin 3.8 2.8 - 4.5 g/dL    Albumin/Globulin Ratio 0.9 0.4 - 1.6     Magnesium   Result Value Ref Range    Magnesium 1.7 (L) 1.8 - 2.4 mg/dL   POCT Glucose   Result Value Ref Range    POC Glucose 51 (L) 65 - 100 mg/dL    Performed by: Wong Elaine    POCT Glucose   Result Value Ref Range    POC Glucose 49 (L) 65 - 100 mg/dL    Performed by:  Amy    POCT Glucose   Result Value Ref Range    POC Glucose 92 65 - 100 mg/dL    Performed by: YoderTravisSPENCER    EKG 12 Lead   Result Value Ref Range    Ventricular Rate 70 BPM    Atrial Rate 71 BPM    P-R Interval 125 ms    QRS Duration 73 ms    Q-T Interval 388 ms    QTc Calculation (Bazett) 419 ms    P Axis 61 degrees    R Axis 36 degrees    T Axis 35 degrees    Diagnosis Sinus rhythm         CT Head W/O Contrast   Final Result   No CT evidence of acute intracranial abnormality. XR CHEST (2 VW)   Final Result   The lungs are clear. The heart size is normal in size. No   pneumothorax. No pleural effusions. Voice dictation software was used during the making of this note. This software is not perfect and grammatical and other typographical errors may be present. This note has not been completely proofread for errors.         GRIFFIN Fajardo  02[facility]02/23 [facility] [address]  02/02/23 8523

## 2023-02-02 NOTE — PROGRESS NOTES
TRANSFER - IN REPORT:    Verbal report received from ERIN Calhoun(name) on [employer]  being received from ED(unit) for routine progression of patient care      Report consisted of patients Situation, Background, Assessment and   Recommendations(SBAR). Information from the following report(s) Nurse Handoff Report and MAR was reviewed with the receiving nurse. Opportunity for questions and clarification was provided.       Assessment completed upon patients arrival to unit and care assume

## 2023-02-02 NOTE — PROGRESS NOTES
ACUTE OCCUPATIONAL THERAPY GOALS:   (Developed with and agreed upon by patient and/or caregiver.)  No goals, evaluation and d/c. Pt at functional baseline.     OCCUPATIONAL THERAPY Initial Assessment, Daily Note, and Discharge       OT Visit Days: 1  Acknowledge Orders  Time  OT Charge Capture  Rehab Caseload Tracker      Nayla Mart is a 42 y.o. female   PRIMARY DIAGNOSIS: Hypoglycemia  Dizziness [R42]  Hypoglycemia [E16.2]  General weakness [R53.1]  ANDRZEJ (acute kidney injury) (H[facility]) [N17.9]       Reason for Referral: Generalized Muscle Weakness (M62.81)  Dizziness and Giddiness (R42)  Inpatient: Payor: BCBS / [place] / Product Type: *No Product type* /     ASSESSMENT:     REHAB RECOMMENDATIONS:   Recommendation to date pending progress:  Setting:  No further skilled therapy after discharge from hospital    Equipment:    None     ASSESSMENT:  Ms. Mart is a 43 y/o female presents with hypoglycemia and dizziness. At baseline pt lives with her mother, is independent all ADLs and worked at [employer] in the kitchen. Today pt seen in ER and overall supervision no AD for functional transfers and mobility of household/community distances in hallway. Pt able to complete grooming ADLs and LE dressing with supervision and fair+ dynamic balance. Pt did not c/o dizziness, weakness or fatigue throughout. Pt appears to be functioning at her baseline and does not need further OT services during acute care stay or at d/c. Will d/c from caseload.      [facility] AM-PAC™ “6 Clicks” Daily Activity Inpatient Short Form:    AM-PAC Daily Activity - Inpatient   How much help is needed for putting on and taking off regular lower body clothing?: None  How much help is needed for bathing (which includes washing, rinsing, drying)?: None  How much help is needed for toileting (which includes using toilet, bedpan, or urinal)?: None  How much help is needed for putting on and taking off regular upper body clothing?: None  How  much help is needed for taking care of personal grooming?: None  How much help for eating meals?: None  AM-PAC Inpatient Daily Activity Raw Score: 24  AM-PAC Inpatient ADL T-Scale Score : 57.54  ADL Inpatient CMS 0-100% Score: 0  ADL Inpatient CMS G-Code Modifier : CH           SUBJECTIVE:     Ms. Martita Membreno states, \"I have worked here for 25 years\"     Social/Functional Lives With: Parent  Type of Home: Apartment  Home Layout: Two level, Bed/Bath upstairs  Bathroom Shower/Tub: Tub/Shower unit  ADL Assistance: Independent  Active : No    OBJECTIVE:     Megan Paredes / Herson Paige / Neda Aponte: IV    RESTRICTIONS/PRECAUTIONS:       PAIN: VITALS / O2:   Pre Treatment:   Pain Assessment: None - Denies Pain      Post Treatment: no c/o pain, resting comfortably in supine       Vitals          Oxygen            GROSS EVALUATION: INTACT IMPAIRED   (See Comments)   UE AROM [x] []   UE PROM [x] []   Strength [x]       Posture / Balance [] Posture: Good  Sitting - Static: Good  Sitting - Dynamic: Good  Standing - Static: Good  Standing - Dynamic: Fair, +   Sensation [x]     Coordination [x]       Tone [x]       Edema [x]    Activity Tolerance [x]       Hand Dominance R [] L []      COGNITION/  PERCEPTION: INTACT IMPAIRED   (See Comments)   Orientation [x]     Vision [x]     Hearing [x]     Cognition  [x]     Perception [x]       MOBILITY: I Mod I S SBA CGA Min Mod Max Total  NT x2 Comments:   Bed Mobility    Rolling [] [] [] [] [] [] [] [] [] [x] []    Supine to Sit [x] [] [] [] [] [] [] [] [] [] []    Scooting [x] [] [] [] [] [] [] [] [] [] []    Sit to Supine [x] [] [] [] [] [] [] [] [] [] []    Transfers    Sit to Stand [] [] [x] [] [] [] [] [] [] [] [] No AD   Bed to Chair [] [] [x] [] [] [] [] [] [] [] [] No AD   Stand to Sit [] [] [x] [] [] [] [] [] [] [] [] No AD   Tub/Shower [] [] [] [] [] [] [] [] [] [x] []     Toilet [] [] [] [] [] [] [] [] [] [x] []      [] [] [] [] [] [] [] [] [] [x] []    I=Independent, Mod I=Modified Independent, S=Supervision/Setup, SBA=Standby Assistance, CGA=Contact Guard Assistance, Min=Minimal Assistance, Mod=Moderate Assistance, Max=Maximal Assistance, Total=Total Assistance, NT=Not Tested    ACTIVITIES OF DAILY LIVING: I Mod I S SBA CGA Min Mod Max Total NT Comments   BASIC ADLs:              Upper Body Bathing  [] [] [] [] [] [] [] [] [] [x]    Lower Body Bathing [] [] [] [] [] [] [] [] [] [x]    Toileting [] [] [] [] [] [] [] [] [] [x]    Upper Body Dressing [] [] [] [] [] [] [] [] [] [x]    Lower Body Dressing [x] [] [] [] [] [] [] [] [] [] Donning/doffing shoes sitting in chair   Feeding [] [] [] [] [] [] [] [] [] [x]    Grooming [] [] [x] [] [] [] [] [] [] [] Brushing teeth standing at sink   Personal Device Care [] [] [] [] [] [] [] [] [] [x]    Functional Mobility [] [] [x] [] [] [] [] [] [] [] No AD   I=Independent, Mod I=Modified Independent, S=Supervision/Setup, SBA=Standby Assistance, CGA=Contact Guard Assistance, Min=Minimal Assistance, Mod=Moderate Assistance, Max=Maximal Assistance, Total=Total Assistance, NT=Not Tested    PLAN:   FREQUENCY/DURATION   OT Plan of Care:  (eval & d/c) for duration of hospital stay or until stated goals are met, whichever comes first.    PROBLEM LIST:   (Skilled intervention is medically necessary to address:)  N/A   INTERVENTIONS PLANNED:  (Benefits and precautions of occupational therapy have been discussed with the patient.)  N/A         TREATMENT:     EVALUATION: LOW COMPLEXITY: (Untimed Charge)    TREATMENT:   Self Care (10 minutes): Patient participated in lower body dressing and grooming ADLs in unsupported sitting and standing with no   cueing to increase independence, decrease assistance required, and increase activity tolerance. Patient also participated in functional mobility and functional transfer training to increase independence, decrease assistance required, and increase activity tolerance.      TREATMENT GRID:  N/A    AFTER TREATMENT PRECAUTIONS: Bed, Call light within reach, Needs within reach, and RN notified    INTERDISCIPLINARY COLLABORATION:  RN/ PCT and OT/ VALENZUELA    EDUCATION:  Education Given To: Patient  Education Provided: Role of Therapy  Education Method: Verbal  Barriers to Learning: None  Education Outcome: Verbalized understanding    TOTAL TREATMENT DURATION AND TIME:  Time In: [address]  [facility]  Minutes: [facility],

## 2023-02-02 NOTE — ED TRIAGE NOTES
Pt to ED from work. Pt states dizziness and headache for two weeks. Coworker and pt states confusion. Pt hx of diabetic. Coworker states pt was stumbling and off balance and almost fell at work. Pt states BGLs have been about 150s at home. Pt denies chest pain, SOB, NVD.

## 2023-02-02 NOTE — H&P
Hospitalist History and Physical   Admit Date:  2023 10:25 AM   Name:  Sury Page   Age:  38 y.o. Sex:  female  :  1980   MRN:  [id number]   Room:  ER04/    Presenting Complaint: Dizziness and Headache     Reason(s) for Admission: Hypoglycemia [E16.2]     History of Present Illness:   Sury Page is a 38 y.o. female with medical history of HTN, dyslipidemia, DM-2 presented to ER with c/o fatigue, lightheadedness/dizziness, diaphoresis for about few days PTA. Pt denies chest pain, SOB, cough, fever, chills, night sweats, diarrhea, urinary symptoms. Pt was noted to diaphoretic in ER, found to be blood glucose of 50, repeat one showed glucose of 40. Pt has had episodes of diaphoresis/dizziness several times at work and was told by her coworker that this could be due to low blood sugar. Last A1c from 22 was 5.9. pt is on metformin, trulicity at home. ER work up remarkable for Cr 2.2 (baseline around 1.3-1.5)    Assessment & Plan:     Principal Problem:    Hypoglycemia  Plan: 2/2-  Likely medication induced  Pt is on metformin and trulicity at home  Will hold both for now  Last A1c 5.9 from 22  Will keep pt on D5 infusion  Diabetes educator chinatn in AM    ANDRZEJ  Plan: 2/2-  Continue IV fluids  Prerenal etiology  Holding HCTZ  Check AM labs  Baseline Cr around 1.3-1.5  Creatinine 2.2 on admission    Active Problems:    Dyslipidemia  Plan: lipitor 40 mg po qhs      Primary hypertension  Plan: BP is optimally controlled  Can add losartan once renal function has improved  BP currently optimally controled, monitor off antihypertensives. Obesity  Plan: 2/2-  Increased all cause mortality and medical complexity. BMI 40.4  Encouraged to lose weight      PT/OT      Anticipated discharge needs:     Potential discharge to home in 1-2 days if no further episodes of hypoglycemia is noted    Diet: ADULT DIET;  Regular  VTE ppx: lovenox  Code status: 3992 [place] Problems:  Principal Problem:    Hypoglycemia  Active Problems:    Dyslipidemia    Primary hypertension    Acute kidney injury ([facility] [address]    Class 3 severe obesity in adult [facility])  Resolved Problems:    * No resolved hospital problems. *       Past History:     Past Medical History:   Diagnosis Date    Diabetes ([facility] [address]     Hypertension        History reviewed. No pertinent surgical history. Social History     Tobacco Use    Smoking status: Never    Smokeless tobacco: Never   Substance Use Topics    Alcohol use: Yes      Social History     Substance and Sexual Activity   Drug Use No       No family history on file.      Immunization History   Administered Date(s) Administered    COVID-19, PFIZER PURPLE top, DILUTE for use, (age 15 y+), 30mcg/0.3mL 03/30/2021, 04/20/2021    Td vaccine (adult) 01/01/2006     Allergies   Allergen Reactions    Food Hives and Swelling    Pineapple Swelling     Prior to Admit Medications:  Current Outpatient Medications   Medication Instructions    Amoxicillin 500 mg, Oral, 3 TIMES DAILY    aspirin 81 mg, Oral, DAILY    atorvastatin (LIPITOR) 40 mg, Oral, Nightly    azelastine (ASTELIN) 0.1 % nasal spray 1 spray, Nasal, 2 TIMES DAILY    Dextromethorphan-guaiFENesin  MG TB12 1 tablet, Oral, 2 TIMES DAILY PRN    famotidine (PEPCID) 20 mg, Oral, DAILY    hydroCHLOROthiazide (HYDRODIURIL) 25 mg, Oral, DAILY    metFORMIN (GLUCOPHAGE) 500 mg, Oral, 2 TIMES DAILY WITH MEALS    predniSONE 10 MG (21) TBPK As instructed on box    Trulicity 4.5 mg, SubCUTAneous, WEEKLY         Objective:   Patient Vitals for the past 24 hrs:   Temp Pulse Resp BP SpO2   02/02/23 1216 -- 84 19 128/68 --   02/02/23 1156 -- 81 18 124/63 --   02/02/23 1146 -- 81 18 127/66 --   02/02/23 1126 -- 75 16 124/60 --   02/02/23 1116 -- 87 21 109/67 --   02/02/23 1106 -- 86 18 -- --   02/02/23 1056 -- -- -- 117/72 100 %   02/02/23 1014 98.1 °F (36.7 °C) 70 16 114/63 100 %       Oxygen Therapy  SpO2: 100 %  O2 Device: None (Room air)    Estimated body mass index is 40.43 kg/m² as calculated from the following:    Height as of this encounter: 5' (1.524 m). Weight as of this encounter: 207 lb (93.9 kg). No intake or output data in the 24 hours ending 02/02/23 1231      Physical Exam:  Blood pressure 128/68, pulse 84, temperature 98.1 °F (36.7 °C), temperature source Oral, resp. rate 19, height 5' (1.524 m), weight 207 lb (93.9 kg), SpO2 100 %. General:    Alert/awake, obese, NAD on RA  Head:  Normocephalic, atraumatic  Eyes:  Sclerae appear normal.  Pupils equally round. ENT:  Nares appear normal.  Moist oral mucosa  Neck:  No restricted ROM. Trachea midline   CV:   RRR. No m/r/g. No jugular venous distension. Lungs:   CTAB. No wheezing, rhonchi, or rales. Symmetric expansion. Abdomen:   Soft, nontender, nondistended. Extremities: No cyanosis or clubbing. No edema  Skin:     No rashes and normal coloration. Warm and dry. Neuro:  CN II-XII grossly intact. Sensation intact. Psych:  Normal mood and affect.       I have personally reviewed labs and tests:  Recent Labs:  Recent Results (from the past 24 hour(s))   POCT Glucose    Collection Time: 02/02/23 10:17 AM   Result Value Ref Range    POC Glucose 51 (L) 65 - 100 mg/dL    Performed by: Jennelle Goldmann    CBC with Auto Differential    Collection Time: 02/02/23 10:26 AM   Result Value Ref Range    WBC 6.5 4.3 - 11.1 K/uL    RBC 3.25 (L) 4.05 - 5.2 M/uL    Hemoglobin 9.7 (L) 11.7 - 15.4 g/dL    Hematocrit 29.9 (L) 35.8 - 46.3 %    MCV 92.0 82 - 102 FL    MCH 29.8 26.1 - 32.9 PG    MCHC 32.4 31.4 - 35.0 g/dL    RDW 13.9 11.9 - 14.6 %    Platelets 958 659 - 713 K/uL    MPV 10.4 9.4 - 12.3 FL    nRBC 0.00 0.0 - 0.2 K/uL    Differential Type AUTOMATED      Seg Neutrophils 61 43 - 78 %    Lymphocytes 29 13 - 44 %    Monocytes 7 4.0 - 12.0 %    Eosinophils % 1 0.5 - 7.8 %    Basophils 1 0.0 - 2.0 %    Immature Granulocytes 1 0.0 - 5.0 %    Segs Absolute 4.0 1.7 - 8.2 K/UL Absolute Lymph # 1.9 0.5 - 4.6 K/UL    Absolute Mono # 0.5 0.1 - 1.3 K/UL    Absolute Eos # 0.1 0.0 - 0.8 K/UL    Basophils Absolute 0.0 0.0 - 0.2 K/UL    Absolute Immature Granulocyte 0.0 0.0 - 0.5 K/UL   CMP    Collection Time: 02/02/23 10:26 AM   Result Value Ref Range    Sodium 142 133 - 143 mmol/L    Potassium 4.0 3.5 - 5.1 mmol/L    Chloride 109 101 - 110 mmol/L    CO2 25 21 - 32 mmol/L    Anion Gap 8 2 - 11 mmol/L    Glucose 46 (L) 65 - 100 mg/dL    BUN 30 (H) 6 - 23 MG/DL    Creatinine 2.20 (H) 0.6 - 1.0 MG/DL    Est, Glom Filt Rate 28 (L) >60 ml/min/1.73m2    Calcium 8.9 8.3 - 10.4 MG/DL    Total Bilirubin 0.2 0.2 - 1.1 MG/DL    ALT 33 12 - 65 U/L    AST 23 15 - 37 U/L    Alk Phosphatase 55 50 - 136 U/L    Total Protein 7.3 6.3 - 8.2 g/dL    Albumin 3.5 3.5 - 5.0 g/dL    Globulin 3.8 2.8 - 4.5 g/dL    Albumin/Globulin Ratio 0.9 0.4 - 1.6     Magnesium    Collection Time: 02/02/23 10:26 AM   Result Value Ref Range    Magnesium 1.7 (L) 1.8 - 2.4 mg/dL   POCT Glucose    Collection Time: 02/02/23 10:49 AM   Result Value Ref Range    POC Glucose 49 (L) 65 - 100 mg/dL    Performed by: Amy    EKG 12 Lead    Collection Time: 02/02/23 11:00 AM   Result Value Ref Range    Ventricular Rate 70 BPM    Atrial Rate 71 BPM    P-R Interval 125 ms    QRS Duration 73 ms    Q-T Interval 388 ms    QTc Calculation (Bazett) 419 ms    P Axis 61 degrees    R Axis 36 degrees    T Axis 35 degrees    Diagnosis Sinus rhythm    POCT Glucose    Collection Time: 02/02/23 11:38 AM   Result Value Ref Range    POC Glucose 92 65 - 100 mg/dL    Performed by: Alfredo Hare I have personally reviewed imaging studies:  XR CHEST (2 VW)    Result Date: 2/2/2023  XR CHEST (2 VW) 2/2/2023 10:33 AM HISTORY: Dizziness. COMPARISON: None. AP and lateral views of the chest were obtained. The lungs are clear. The heart size is normal in size. No pneumothorax. No pleural effusions.       CT Head W/O Contrast    Result Date: 2/2/2023  CT HEAD WO CONTRAST 2/2/2023 10:43 AM HISTORY: Headache, dizziness and confusion over the past 2 weeks. COMPARISON: None available. TECHNIQUE: Multiple axial images obtained through the brain without intravenous contrast.  Radiation dose reduction techniques were used for this study: All CT scans performed at this facility use one or all of the following: Automated exposure control, adjustment of the mA and/or kVp according to patient's size, iterative reconstruction. FINDINGS: No areas of abnormal attenuation are seen in the brain. There is no CT evidence of acute hemorrhage or infarction. The ventricles are normal in size. There are no extra-axial fluid collections. No masses are seen. Mucous retention cyst or polyp noted in the left maxillary sinus. Remaining paranasal sinuses and mastoid air cells are clear. There are no bony lesions. No CT evidence of acute intracranial abnormality. Echocardiogram:  No results found for this or any previous visit.         Orders Placed This Encounter   Medications    DISCONTD: 0.9 % sodium chloride bolus    dextrose 10 % infusion    aspirin chewable tablet 81 mg    atorvastatin (LIPITOR) tablet 40 mg    famotidine (PEPCID) tablet 20 mg    hydroCHLOROthiazide (HYDRODIURIL) tablet 25 mg    sodium chloride flush 0.9 % injection 5-40 mL    sodium chloride flush 0.9 % injection 5-40 mL    0.9 % sodium chloride infusion    enoxaparin (LOVENOX) injection 40 mg     Order Specific Question:   Indication of Use     Answer:   Prophylaxis-DVT/PE    OR Linked Order Group     ondansetron (ZOFRAN-ODT) disintegrating tablet 4 mg     ondansetron (ZOFRAN) injection 4 mg    polyethylene glycol (GLYCOLAX) packet 17 g    OR Linked Order Group     acetaminophen (TYLENOL) tablet 650 mg     acetaminophen (TYLENOL) suppository 650 mg    magnesium sulfate 2000 mg in 50 mL IVPB premix    OR Linked Order Group     potassium chloride (KLOR-CON M) extended release tablet 40 mEq potassium bicarb-citric acid (EFFER-K) effervescent tablet 40 mEq     potassium chloride 10 mEq/100 mL IVPB (Peripheral Line)    dextrose 5 % in lactated ringers infusion         Signed:  Katrin Israel MD    Part of this note may have been written by using a voice dictation software. The note has been proof read but may still contain some grammatical/other typographical errors.

## 2023-02-02 NOTE — ED NOTES
TRANSFER - OUT REPORT:    Verbal report given to Tere Fontenot RN on [employer]  being transferred to [address] [place] [address] for routine progression of patient care       Report consisted of patient's Situation, Background, Assessment and   Recommendations(SBAR). Information from the following report(s) ED SBAR was reviewed with the receiving nurse. [place] Assessment: Presents to emergency department  because of falls (Syncope, seizure, or loss of consciousness): No, Age > 79: No, Altered Mental Status, Intoxication with alcohol or substance confusion (Disorientation, impaired judgment, poor safety awaremess, or inability to follow instructions): No, Impaired Mobility: Ambulates or transfers with assistive devices or assistance; Unable to ambulate or transer.: No, Nursing Judgement: No  Lines:   Peripheral IV Right Antecubital (Active)        Opportunity for questions and clarification was provided.       Patient transported with:  transport         Jay Phelps, [address] [place] [address]  02/02/23 5488

## 2023-02-03 VITALS
OXYGEN SATURATION: 98 % | TEMPERATURE: 98.8 F | WEIGHT: 207 LBS | RESPIRATION RATE: 18 BRPM | HEIGHT: 60 IN | BODY MASS INDEX: 40.64 KG/M2 | HEART RATE: 79 BPM | DIASTOLIC BLOOD PRESSURE: 73 MMHG | SYSTOLIC BLOOD PRESSURE: 108 MMHG

## 2023-02-03 LAB
ALBUMIN SERPL-MCNC: 3 G/DL (ref 3.5–5)
ALBUMIN/GLOB SERPL: 0.8 (ref 0.4–1.6)
ALP SERPL-CCNC: 48 U/L (ref 50–136)
ALT SERPL-CCNC: 26 U/L (ref 12–65)
ANION GAP SERPL CALC-SCNC: 4 MMOL/L (ref 2–11)
AST SERPL-CCNC: 19 U/L (ref 15–37)
BASOPHILS # BLD: 0.1 K/UL (ref 0–0.2)
BASOPHILS NFR BLD: 1 % (ref 0–2)
BILIRUB SERPL-MCNC: 0.3 MG/DL (ref 0.2–1.1)
BUN SERPL-MCNC: 20 MG/DL (ref 6–23)
CALCIUM SERPL-MCNC: 9.1 MG/DL (ref 8.3–10.4)
CHLORIDE SERPL-SCNC: 112 MMOL/L (ref 101–110)
CO2 SERPL-SCNC: 24 MMOL/L (ref 21–32)
CREAT SERPL-MCNC: 1.3 MG/DL (ref 0.6–1)
DIFFERENTIAL METHOD BLD: ABNORMAL
EOSINOPHIL # BLD: 0.1 K/UL (ref 0–0.8)
EOSINOPHIL NFR BLD: 2 % (ref 0.5–7.8)
ERYTHROCYTE [DISTWIDTH] IN BLOOD BY AUTOMATED COUNT: 13.7 % (ref 11.9–14.6)
GLOBULIN SER CALC-MCNC: 3.7 G/DL (ref 2.8–4.5)
GLUCOSE BLD STRIP.AUTO-MCNC: 113 MG/DL (ref 65–100)
GLUCOSE BLD STRIP.AUTO-MCNC: 120 MG/DL (ref 65–100)
GLUCOSE BLD STRIP.AUTO-MCNC: 125 MG/DL (ref 65–100)
GLUCOSE BLD STRIP.AUTO-MCNC: 97 MG/DL (ref 65–100)
GLUCOSE SERPL-MCNC: 116 MG/DL (ref 65–100)
HCT VFR BLD AUTO: 30.1 % (ref 35.8–46.3)
HGB BLD-MCNC: 9.8 G/DL (ref 11.7–15.4)
IMM GRANULOCYTES # BLD AUTO: 0 K/UL (ref 0–0.5)
IMM GRANULOCYTES NFR BLD AUTO: 0 % (ref 0–5)
LYMPHOCYTES # BLD: 1.7 K/UL (ref 0.5–4.6)
LYMPHOCYTES NFR BLD: 38 % (ref 13–44)
MCH RBC QN AUTO: 30 PG (ref 26.1–32.9)
MCHC RBC AUTO-ENTMCNC: 32.6 G/DL (ref 31.4–35)
MCV RBC AUTO: 92 FL (ref 82–102)
MONOCYTES # BLD: 0.5 K/UL (ref 0.1–1.3)
MONOCYTES NFR BLD: 11 % (ref 4–12)
NEUTS SEG # BLD: 2.1 K/UL (ref 1.7–8.2)
NEUTS SEG NFR BLD: 48 % (ref 43–78)
NRBC # BLD: 0 K/UL (ref 0–0.2)
PLATELET # BLD AUTO: 196 K/UL (ref 150–450)
PMV BLD AUTO: 10.7 FL (ref 9.4–12.3)
POTASSIUM SERPL-SCNC: 4.7 MMOL/L (ref 3.5–5.1)
PROT SERPL-MCNC: 6.7 G/DL (ref 6.3–8.2)
RBC # BLD AUTO: 3.27 M/UL (ref 4.05–5.2)
SERVICE CMNT-IMP: ABNORMAL
SERVICE CMNT-IMP: NORMAL
SODIUM SERPL-SCNC: 140 MMOL/L (ref 133–143)
WBC # BLD AUTO: 4.4 K/UL (ref 4.3–11.1)

## 2023-02-03 PROCEDURE — 82962 GLUCOSE BLOOD TEST: CPT

## 2023-02-03 PROCEDURE — 85025 COMPLETE CBC W/AUTO DIFF WBC: CPT

## 2023-02-03 PROCEDURE — 80053 COMPREHEN METABOLIC PANEL: CPT

## 2023-02-03 PROCEDURE — 36415 COLL VENOUS BLD VENIPUNCTURE: CPT

## 2023-02-03 PROCEDURE — 6370000000 HC RX 637 (ALT 250 FOR IP): Performed by: HOSPITALIST

## 2023-02-03 PROCEDURE — 2580000003 HC RX 258: Performed by: HOSPITALIST

## 2023-02-03 RX ORDER — INSULIN LISPRO 100 [IU]/ML
INJECTION, SOLUTION INTRAVENOUS; SUBCUTANEOUS 3 TIMES DAILY
COMMUNITY

## 2023-02-03 RX ORDER — INSULIN DEGLUDEC 200 U/ML
132 INJECTION, SOLUTION SUBCUTANEOUS 2 TIMES DAILY
COMMUNITY

## 2023-02-03 RX ADMIN — SODIUM CHLORIDE, PRESERVATIVE FREE 10 ML: 5 INJECTION INTRAVENOUS at 08:05

## 2023-02-03 RX ADMIN — FAMOTIDINE 20 MG: 20 TABLET, FILM COATED ORAL at 08:05

## 2023-02-03 RX ADMIN — ASPIRIN 81 MG: 81 TABLET, CHEWABLE ORAL at 08:05

## 2023-02-03 NOTE — CARE COORDINATION
MSN, CM:  patient to be discharge today with no services ordered or requested. Patient agrees with this discharge plan and has met all milestones for this admission. Family to transport patient home. 02/03/23 0949   Service Assessment   Patient Orientation Alert and [facility] At/After Discharge   Services At/After Discharge None   Mode of Transport at Discharge Other (see comment)  (family to transport)   Confirm Follow Up Transport Friends   Condition of Participation: Discharge Planning   The Patient and/Or Patient Representative agree with the Discharge Plan?  Yes

## 2023-02-03 NOTE — CARE COORDINATION
MSN, CM:  spoke with patient this AM about discharge planning. Patient lives with her mother and two brothers in a 2 story condo. Patient is independent with all ADLs' and requires no equipment for ambulation. Patient works full time at [employer] and her family drives her to all appointments. Patient has not discharge needs at this time. Case Management will continue to follow for any discharge needs that may rise. 02/03/23 0828   Service Assessment   Patient Orientation Alert and Oriented   Cognition Alert   History Provided By Patient   Primary Caregiver Self   Support Systems Parent; Family Members   Patient's [address] is: Legal Next of Kin  (mother)   PCP Verified by CM Yes   Last Visit to PCP Within last 3 months   Prior Functional Level Independent in ADLs/IADLs   Current Functional Level Independent in ADLs/IADLs   Can patient return to prior living arrangement Yes   Ability to make needs known: Good   Family able to assist with home care needs: Yes   Would you like for me to discuss the discharge plan with any other family members/significant others, and if so, who? Yes   Financial Resources Other (Comment)  (BCBS)   Community Resources None   Social/Functional History   Lives With Family;Parent   Type of Home Condo   Home Layout Two level   Home Access Level entry   Bathroom Shower/Tub Tub/Shower unit   Bathroom Toilet Standard   Bathroom Equipment None   Bathroom Accessibility Accessible   Receives Help From [address] [facility] Responsibilities Yes   Ambulation Assistance Independent   Transfer Assistance Independent   Active  No   Patient's  Info family   Occupation Full time employment   Type of Occupation SFD   Discharge Planning   Type of Residence [address] [facility] Family Members;Parent   Current Services Prior To Admission None   DME Ordered?  No   Potential Assistance Purchasing Medications No   Type of Home Care Services None   Patient expects to be discharged to: Apartment   One/Two Story Residence Two story   History of falls? 0

## 2023-02-03 NOTE — DISCHARGE SUMMARY
Hospitalist Discharge Summary   Admit Date:  2023 10:25 AM   DC Note date: 2/3/2023  Name:  Sury Page   Age:  38 y.o. Sex:  female  :  1980   MRN:  [id number]   Room:  [facility]  PCP:  Gretel ANAYA DO    Presenting Complaint: Dizziness and Headache     Initial Admission Diagnosis: Dizziness [R42]  Hypoglycemia [E16.2]  General weakness [R53.1]  ANDRZEJ (acute kidney injury) ([address] [N17.9]     Problem List for this Hospitalization (present on admission):    Principal Problem:    Hypoglycemia  Active Problems:    Dyslipidemia    Primary hypertension    Acute kidney injury ([facility] [address]    Class 3 severe obesity in adult [facility])  Resolved Problems:    * No resolved hospital problems. [facility] Course:  24-year-old female history of diabetes on Trulicity and metformin, hypertension, chronic kidney disease with baseline creatinine around 1.3-1.4. She presented to the hospital with symptomatic hypoglycemia and acute kidney injury. Serum creatinine 2.2. Glucose responded to dextrose replacement and has remained stable off all meds with last 4 fingerstick blood sugars 130, 114, 120 and 97 respectively. Her blood pressure off of her HCTZ-held because of ANDRZEJ-is normotensive and most recent blood pressure this morning was 108/73. She will be discharged with no diabetic agents and currently no blood pressure medication with discharge creatinine improved to baseline at 1.3 morning of discharge. She may require alternate BP med but would avoid diuretic given her ANDRZEJ with likely resulting hyperglycemia. At present time sugars are controlled with diet. She is to follow-up with primary care within the next 1 week and as soon as able and continue fingerstick blood sugar monitoring. Disposition: Stable for discharge home  Diet: ADULT DIET; Regular  Code Status: Full Code    Follow Ups:   Follow-up Information     ORIANA ANAYA DO Follow up in 1 week(s).     Specialty: Internal Medicine  Why: make appt prior to discharge  Contact information:  [place] [address]  [phone]                       Time spent in patient discharge and coordination 32 minutes. Follow up labs/diagnostics (ultimately defer to outpatient provider): At discretion of primary care/outpatient providers     Plan was discussed with patient and staff. All questions answered. Patient was stable at time of discharge. Instructions given to call a physician or return if any concerns. Current Discharge Medication List        CONTINUE these medications which have NOT CHANGED    Details   aspirin 81 MG chewable tablet Take 81 mg by mouth daily      atorvastatin (LIPITOR) 40 MG tablet Take 40 mg by mouth at bedtime           STOP taking these medications       Dulaglutide (TRULICITY) 4.5 XZ/1.2PO SOPN Comments:   Reason for Stopping:         famotidine (PEPCID) 20 MG tablet Comments:   Reason for Stopping:         Amoxicillin 500 MG TABS Comments:   Reason for Stopping:         azelastine (ASTELIN) 0.1 % nasal spray Comments:   Reason for Stopping:         Dextromethorphan-guaiFENesin  MG TB12 Comments:   Reason for Stopping:         hydroCHLOROthiazide (HYDRODIURIL) 25 MG tablet Comments:   Reason for Stopping:         metFORMIN (GLUCOPHAGE) 500 MG tablet Comments:   Reason for Stopping:         predniSONE 10 MG (21) TBPK Comments:   Reason for Stopping:               Some medications may have been reported old/obsolete and marked \"stop taking\" by the system; in reality pt was already off these meds; defer to outpatient or prescribing providers. Procedures done this admission:  * No surgery found *    Consults this admission:  IP CONSULT TO DIABETES MANAGEMENT    Echocardiogram results:  No results found for this or any previous visit. Diagnostic Imaging/Tests:   XR CHEST (2 VW)    Result Date: 2/2/2023  The lungs are clear. The heart size is normal in size. No pneumothorax.  No pleural effusions. CT Head W/O Contrast    Result Date: 2/2/2023  No CT evidence of acute intracranial abnormality. Labs: Results:       BMP, Mg, Phos Recent Labs     02/02/23  1026 02/03/23  0330    140   K 4.0 4.7    112*   CO2 25 24   ANIONGAP 8 4   BUN 30* 20   CREATININE 2.20* 1.30*   LABGLOM 28* 52*   CALCIUM 8.9 9.1   GLUCOSE 46* 116*   MG 1.7*  --       CBC Recent Labs     02/02/23  1026 02/03/23  0330   WBC 6.5 4.4   RBC 3.25* 3.27*   HGB 9.7* 9.8*   HCT 29.9* 30.1*   MCV 92.0 92.0   MCH 29.8 30.0   MCHC 32.4 32.6   RDW 13.9 13.7    196   MPV 10.4 10.7   NRBC 0.00 0.00   SEGS 61 48   LYMPHOPCT 29 38   EOSRELPCT 1 2   MONOPCT 7 11   BASOPCT 1 1   IMMGRAN 1 0   SEGSABS 4.0 2.1   LYMPHSABS 1.9 1.7   EOSABS 0.1 0.1   MONOSABS 0.5 0.5   BASOSABS 0.0 0.1   ABSIMMGRAN 0.0 0.0      LFT Recent Labs     02/02/23  1026 02/03/23  0330   BILITOT 0.2 0.3   ALKPHOS 55 48*   AST 23 19   ALT 33 26   PROT 7.3 6.7   LABALBU 3.5 3.0*   GLOB 3.8 3.7      Cardiac  No results found for: NTPROBNP, TROPHS   Coags No results found for: PROTIME, INR, APTT   A1c No results found for: LABA1C, EAG   Lipids No results found for: CHOL, LDLCALC, LABVLDL, HDL, CHOLHDLRATIO, TRIG   Thyroid  No results found for: Grace Fass     Most Recent UA Lab Results   Component Value Date/Time    COLORU YELLOW/STRAW 02/02/2023 12:50 PM    APPEARANCE CLEAR 02/02/2023 12:50 PM    SPECGRAV 1.011 02/02/2023 12:50 PM    LABPH 6.0 02/02/2023 12:50 PM    PROTEINU Negative 02/02/2023 12:50 PM    GLUCOSEU Negative 02/02/2023 12:50 PM    KETUA Negative 02/02/2023 12:50 PM    BILIRUBINUR Negative 02/02/2023 12:50 PM    BLOODU Negative 02/02/2023 12:50 PM    UROBILINOGEN 0.2 02/02/2023 12:50 PM    NITRU Negative 02/02/2023 12:50 PM    LEUKOCYTESUR Negative 02/02/2023 12:50 PM        No results for input(s): CULTURE in the last 720 hours.     All Labs from Last 24 Hrs:  Recent Results (from the past 24 hour(s))   POCT Glucose    Collection Time: 02/02/23 10:17 AM   Result Value Ref Range    POC Glucose 51 (L) 65 - 100 mg/dL    Performed by: Víctor Gonzalez    CBC with Auto Differential    Collection Time: 02/02/23 10:26 AM   Result Value Ref Range    WBC 6.5 4.3 - 11.1 K/uL    RBC 3.25 (L) 4.05 - 5.2 M/uL    Hemoglobin 9.7 (L) 11.7 - 15.4 g/dL    Hematocrit 29.9 (L) 35.8 - 46.3 %    MCV 92.0 82 - 102 FL    MCH 29.8 26.1 - 32.9 PG    MCHC 32.4 31.4 - 35.0 g/dL    RDW 13.9 11.9 - 14.6 %    Platelets 116 325 - 766 K/uL    MPV 10.4 9.4 - 12.3 FL    nRBC 0.00 0.0 - 0.2 K/uL    Differential Type AUTOMATED      Seg Neutrophils 61 43 - 78 %    Lymphocytes 29 13 - 44 %    Monocytes 7 4.0 - 12.0 %    Eosinophils % 1 0.5 - 7.8 %    Basophils 1 0.0 - 2.0 %    Immature Granulocytes 1 0.0 - 5.0 %    Segs Absolute 4.0 1.7 - 8.2 K/UL    Absolute Lymph # 1.9 0.5 - 4.6 K/UL    Absolute Mono # 0.5 0.1 - 1.3 K/UL    Absolute Eos # 0.1 0.0 - 0.8 K/UL    Basophils Absolute 0.0 0.0 - 0.2 K/UL    Absolute Immature Granulocyte 0.0 0.0 - 0.5 K/UL   CMP    Collection Time: 02/02/23 10:26 AM   Result Value Ref Range    Sodium 142 133 - 143 mmol/L    Potassium 4.0 3.5 - 5.1 mmol/L    Chloride 109 101 - 110 mmol/L    CO2 25 21 - 32 mmol/L    Anion Gap 8 2 - 11 mmol/L    Glucose 46 (L) 65 - 100 mg/dL    BUN 30 (H) 6 - 23 MG/DL    Creatinine 2.20 (H) 0.6 - 1.0 MG/DL    Est, Glom Filt Rate 28 (L) >60 ml/min/1.73m2    Calcium 8.9 8.3 - 10.4 MG/DL    Total Bilirubin 0.2 0.2 - 1.1 MG/DL    ALT 33 12 - 65 U/L    AST 23 15 - 37 U/L    Alk Phosphatase 55 50 - 136 U/L    Total Protein 7.3 6.3 - 8.2 g/dL    Albumin 3.5 3.5 - 5.0 g/dL    Globulin 3.8 2.8 - 4.5 g/dL    Albumin/Globulin Ratio 0.9 0.4 - 1.6     Magnesium    Collection Time: 02/02/23 10:26 AM   Result Value Ref Range    Magnesium 1.7 (L) 1.8 - 2.4 mg/dL   POCT Glucose    Collection Time: 02/02/23 10:49 AM   Result Value Ref Range    POC Glucose 49 (L) 65 - 100 mg/dL    Performed by:  Amy    EKG 12 Lead    Collection Time: 02/02/23 11:00 AM   Result Value Ref Range    Ventricular Rate 70 BPM    Atrial Rate 71 BPM    P-R Interval 125 ms    QRS Duration 73 ms    Q-T Interval 388 ms    QTc Calculation (Bazett) 419 ms    P Axis 61 degrees    R Axis 36 degrees    T Axis 35 degrees    Diagnosis Sinus rhythm    POCT Glucose    Collection Time: 02/02/23 11:38 AM   Result Value Ref Range    POC Glucose 92 65 - 100 mg/dL    Performed by: Amy    POCT Glucose    Collection Time: 02/02/23 12:35 PM   Result Value Ref Range    POC Glucose 135 (H) 65 - 100 mg/dL    Performed by: Brian    Urinalysis w rflx microscopic    Collection Time: 02/02/23 12:50 PM   Result Value Ref Range    Color, UA YELLOW/STRAW      Appearance CLEAR      Specific Libby, UA 1.011 1.001 - 1.023      pH, Urine 6.0 5.0 - 9.0      Protein, UA Negative NEG mg/dL    Glucose, UA Negative mg/dL    Ketones, Urine Negative NEG mg/dL    Bilirubin Urine Negative NEG      Blood, Urine Negative NEG      Urobilinogen, Urine 0.2 0.2 - 1.0 EU/dL    Nitrite, Urine Negative NEG      Leukocyte Esterase, Urine Negative NEG     POCT Glucose    Collection Time: 02/02/23  1:49 PM   Result Value Ref Range    POC Glucose 130 (H) 65 - 100 mg/dL    Performed by: Amy    POCT Glucose    Collection Time: 02/02/23  3:33 PM   Result Value Ref Range    POC Glucose 118 (H) 65 - 100 mg/dL    Performed by: Lela    POCT Glucose    Collection Time: 02/02/23  6:35 PM   Result Value Ref Range    POC Glucose 121 (H) 65 - 100 mg/dL    Performed by:  Tati    POCT Glucose    Collection Time: 02/02/23  8:35 PM   Result Value Ref Range    POC Glucose 114 (H) 65 - 100 mg/dL    Performed by: Patel    POCT Glucose    Collection Time: 02/03/23 12:26 AM   Result Value Ref Range    POC Glucose 113 (H) 65 - 100 mg/dL    Performed by: VinicioGAYLE    Comprehensive Metabolic Panel w/ Reflex to MG    Collection Time: 02/03/23  3:30 AM Result Value Ref Range    Sodium 140 133 - 143 mmol/L    Potassium 4.7 3.5 - 5.1 mmol/L    Chloride 112 (H) 101 - 110 mmol/L    CO2 24 21 - 32 mmol/L    Anion Gap 4 2 - 11 mmol/L    Glucose 116 (H) 65 - 100 mg/dL    BUN 20 6 - 23 MG/DL    Creatinine 1.30 (H) 0.6 - 1.0 MG/DL    Est, Glom Filt Rate 52 (L) >60 ml/min/1.73m2    Calcium 9.1 8.3 - 10.4 MG/DL    Total Bilirubin 0.3 0.2 - 1.1 MG/DL    ALT 26 12 - 65 U/L    AST 19 15 - 37 U/L    Alk Phosphatase 48 (L) 50 - 136 U/L    Total Protein 6.7 6.3 - 8.2 g/dL    Albumin 3.0 (L) 3.5 - 5.0 g/dL    Globulin 3.7 2.8 - 4.5 g/dL    Albumin/Globulin Ratio 0.8 0.4 - 1.6     CBC with Auto Differential    Collection Time: 02/03/23  3:30 AM   Result Value Ref Range    WBC 4.4 4.3 - 11.1 K/uL    RBC 3.27 (L) 4.05 - 5.2 M/uL    Hemoglobin 9.8 (L) 11.7 - 15.4 g/dL    Hematocrit 30.1 (L) 35.8 - 46.3 %    MCV 92.0 82 - 102 FL    MCH 30.0 26.1 - 32.9 PG    MCHC 32.6 31.4 - 35.0 g/dL    RDW 13.7 11.9 - 14.6 %    Platelets 755 952 - 365 K/uL    MPV 10.7 9.4 - 12.3 FL    nRBC 0.00 0.0 - 0.2 K/uL    Differential Type AUTOMATED      Seg Neutrophils 48 43 - 78 %    Lymphocytes 38 13 - 44 %    Monocytes 11 4.0 - 12.0 %    Eosinophils % 2 0.5 - 7.8 %    Basophils 1 0.0 - 2.0 %    Immature Granulocytes 0 0.0 - 5.0 %    Segs Absolute 2.1 1.7 - 8.2 K/UL    Absolute Lymph # 1.7 0.5 - 4.6 K/UL    Absolute Mono # 0.5 0.1 - 1.3 K/UL    Absolute Eos # 0.1 0.0 - 0.8 K/UL    Basophils Absolute 0.1 0.0 - 0.2 K/UL    Absolute Immature Granulocyte 0.0 0.0 - 0.5 K/UL   POCT Glucose    Collection Time: 02/03/23  4:26 AM   Result Value Ref Range    POC Glucose 120 (H) 65 - 100 mg/dL    Performed by: Patel    POCT Glucose    Collection Time: 02/03/23  7:46 AM   Result Value Ref Range    POC Glucose 97 65 - 100 mg/dL    Performed by:  Tati        Allergies   Allergen Reactions    Food Hives and Swelling    Pineapple Swelling     Immunization History   Administered Date(s) Administered    COVID-19, PFIZER PURPLE top, DILUTE for use, (age 15 y+), 30mcg/0.3mL 03/30/2021, 04/20/2021    Td vaccine (adult) 01/01/2006       Recent Vital Data:  Patient Vitals for the past 24 hrs:   Temp Pulse Resp BP SpO2   02/03/23 0709 98.8 °F (37.1 °C) 79 18 108/73 98 %   02/03/23 0253 98.3 °F (36.8 °C) 83 17 106/64 98 %   02/02/23 2308 98.1 °F (36.7 °C) 86 17 122/85 99 %   02/02/23 2005 98.6 °F (37 °C) 80 18 118/66 99 %   02/02/23 1915 98.5 °F (36.9 °C) 79 19 110/70 99 %   02/02/23 1620 (!) 48.2 °F (9 °C) -- -- -- --   02/02/23 1505 98.6 °F (37 °C) 74 18 133/70 100 %   02/02/23 1336 -- 78 16 -- 98 %   02/02/23 1326 -- 81 (!) 6 120/69 98 %   02/02/23 1231 -- 84 17 125/65 --   02/02/23 1216 -- 84 19 128/68 --   02/02/23 1156 -- 81 18 124/63 --   02/02/23 1146 -- 81 18 127/66 --   02/02/23 1126 -- 75 16 124/60 --   02/02/23 1116 -- 87 21 109/67 --   02/02/23 1106 -- 86 18 -- --   02/02/23 1056 -- -- -- 117/72 100 %   02/02/23 1014 98.1 °F (36.7 °C) 70 16 114/63 100 %       Oxygen Therapy  SpO2: 98 %  O2 Device: None (Room air)    Estimated body mass index is 40.43 kg/m² as calculated from the following:    Height as of this encounter: 5' (1.524 m). Weight as of this encounter: 207 lb (93.9 kg). No intake or output data in the 24 hours ending 02/03/23 0940      Physical Exam:    General:    Well nourished. No overt distress  Head:  Normocephalic, atraumatic  Eyes:  Sclerae appear normal.  Pupils equally round. HENT:  Nares appear normal, no drainage. Moist mucous membranes  Neck:  No restricted ROM. Trachea midline  CV:   RRR. No m/r/g. No JVD  Lungs:   CTAB. No wheezing, rhonchi, or rales. Respirations even, unlabored  Abdomen:   Soft, nontender, nondistended. Extremities: Warm and dry. No cyanosis or clubbing. No edema. Skin:     No rashes. Normal coloration  Neuro:  CN II-XII grossly intact. Psych:  Normal mood and affect.     Signed:  Marquis Cardona DO    Part of this note may have been written by using a voice dictation software. The note has been proof read but may still contain some grammatical/other typographical errors.

## 2023-02-03 NOTE — DIABETES MGMT
Patient admitted with hypoglycemia. Admit blood glucose 46. HgbA1C 5.9 from 11/22/2022. Blood glucose ranged  yesterday with patient receiving D10. Blood glucose this morning was 97. Creatinine 1.30. GFR 52. Glycemic control improving. Patient seen for assessment regarding diabetes management. Patient mother at bedside. Patient has a past medical history of dyslipidemia, HTN, obesity. Patient states they have been living with diabetes for more than 10 years and voices positive family history of diabetes. Patient states they do have a working glucometer with supplies at home. Per patient they typically check blood glucose levels every morning. Per patient their blood glucose levels have been running 115-120 at home. Patient states they are currently taking Tresiba 152 units in the morning and 132 units HS, Humalog TID if blood glucose > 200, Metformin 464 mg BID, and Trulicity 4.5 mg every Monday at home for management of diabetes. Patient voices that they have experienced hypoglycemia in the past. Educated regarding hypoglycemia signs, symptoms, and treatment. Patient has not attended formal diabetes education in the past. Patient reports no difficulty with affording their diabetic supplies. Patient given educational material, \"Diabetes Self-Management: A Patient Teaching Guide\", which was reviewed with patient. Explained basic physiology of diabetes, as well as causes, signs and symptoms, and treatments for hypoglycemia and hyperglycemia. Described the effects of poor glycemic control and the development of long-term complications such as renal, eye, nerve, and cardiovascular disease. Per patient they typically drink water, diet soda, and coffee with Splenda. Reviewed effects of sweetened beverages on glycemic control and discussed alternative beverages to help improve glycemic control. Per patient they typically eat lunch and dinner. Patient states likes tacos and pizza.  Educated re: effects of carbohydrates on blood glucose, the \"plate method\" of healthy meal planning, basics of healthy meal plan, and Consistent Carbohydrate Diet. Discussed target goals for blood glucose and A1C. Educated patient regarding diabetic medications including mechanism of action, timing, and possible side effects. Patient verbalizes understanding of teaching. Explained the importance of blood glucose monitoring. Recommended frequency of AC&HS blood glucose checks and to record in log book to take to PCP appointment. Educated patient on the importance of frequent blood glucose checks (every 2 to 4 hours) while sick. Also discussed the importance of recording all test results. Discussed the importance of hydration. Patient instructed to call PCP for fever >101, persistent nausea/vomiting/diarrhea, shortness of breath, inability to eat or drink for > 4 hours, or persistent hyperglycemia >240mg/dL. Patient verbalized understanding. Patient states uses insulin pens. Patient states has insulin and pen needles at home. Patient educated regarding insulin administration sites. Patient also educated regarding the importance of site rotation, proper storage of insulin, and proper sharps disposal. Instructed patient to always seek guidance from their primary care provider about adjusting their insulin doses and not to adjust them on their own as this could negatively impact their glycemic control or result in hypoglycemia. Patient verbalizes understanding. Encouraged compliance with discharge regimen. Encouraged patient to continue to work on lifestyle modifications and to follow up with PCP (Kasia Jimenez) for further titration of regimen. Patient verbalized understanding and voices no further questions regarding diabetes management at this time.

## 2023-02-03 NOTE — PROGRESS NOTES
Discharge paperwork given to patient. All information reviewed with pt and mother at bedside. Diabetes Educator also at bedside. PIV removed. Mother to transport home.